# Patient Record
Sex: MALE | Race: WHITE | URBAN - METROPOLITAN AREA
[De-identification: names, ages, dates, MRNs, and addresses within clinical notes are randomized per-mention and may not be internally consistent; named-entity substitution may affect disease eponyms.]

---

## 2023-05-11 ENCOUNTER — OFFICE (OUTPATIENT)
Dept: URBAN - METROPOLITAN AREA TELEHEALTH 7 | Facility: TELEHEALTH | Age: 78
End: 2023-05-11
Payer: COMMERCIAL

## 2023-05-11 VITALS — HEIGHT: 70 IN | WEIGHT: 180 LBS

## 2023-05-11 DIAGNOSIS — R19.5 OTHER FECAL ABNORMALITIES: ICD-10-CM

## 2023-05-11 DIAGNOSIS — Z86.010 PERSONAL HISTORY OF COLONIC POLYPS: ICD-10-CM

## 2023-05-11 DIAGNOSIS — Z80.0 FAMILY HISTORY OF MALIGNANT NEOPLASM OF DIGESTIVE ORGANS: ICD-10-CM

## 2023-05-11 PROCEDURE — 99204 OFFICE O/P NEW MOD 45 MIN: CPT | Mod: 95 | Performed by: PHYSICIAN ASSISTANT

## 2023-05-11 NOTE — SERVICEHPINOTES
PATIENT VERIFIED BY DATE OF BIRTH AND NAME. Patient has been consented for this telecommunication visit.
karina 
karina STEWART   is a   77   year old male who is being seen in consultation at the request of   KATEY HAAS   for recent positive Cologuard test. 
karina collins 
Patient actually has a h/o adenomatous polyps (2001, 1998) and family h/o colon cancer in his mother, diagnosed in her 50s. Patient's last colonoscopy was in 2010 and showed moderate diverticulosis. 
karina collins
Patient reports soft stools usually once daily. He has not noticed any blood in stools. Feels well overall, no complaints.karina karina Patient reports being in good health and is not on any chronic medications. No h/o heart disease. He stays active - walks about 2 miles a day, plays golf, and does yard work. karina

## 2023-05-16 ENCOUNTER — AMBULATORY SURGICAL CENTER (OUTPATIENT)
Dept: URBAN - METROPOLITAN AREA SURGERY 23 | Facility: SURGERY | Age: 78
End: 2023-05-16
Payer: COMMERCIAL

## 2023-05-16 DIAGNOSIS — K63.5 POLYP OF COLON: ICD-10-CM

## 2023-05-16 DIAGNOSIS — Z12.11 ENCOUNTER FOR SCREENING FOR MALIGNANT NEOPLASM OF COLON: ICD-10-CM

## 2023-05-16 DIAGNOSIS — R19.5 OTHER FECAL ABNORMALITIES: ICD-10-CM

## 2023-05-16 PROCEDURE — 45380 COLONOSCOPY AND BIOPSY: CPT | Mod: KX,PT | Performed by: INTERNAL MEDICINE
